# Patient Record
Sex: MALE | Race: WHITE | NOT HISPANIC OR LATINO | Employment: FULL TIME | URBAN - METROPOLITAN AREA
[De-identification: names, ages, dates, MRNs, and addresses within clinical notes are randomized per-mention and may not be internally consistent; named-entity substitution may affect disease eponyms.]

---

## 2018-04-09 ENCOUNTER — OFFICE VISIT (OUTPATIENT)
Dept: GASTROENTEROLOGY | Facility: CLINIC | Age: 43
End: 2018-04-09

## 2018-04-09 VITALS
DIASTOLIC BLOOD PRESSURE: 84 MMHG | TEMPERATURE: 98.5 F | HEIGHT: 69 IN | WEIGHT: 279.6 LBS | BODY MASS INDEX: 41.41 KG/M2 | SYSTOLIC BLOOD PRESSURE: 138 MMHG

## 2018-04-09 DIAGNOSIS — K76.0 FATTY LIVER: ICD-10-CM

## 2018-04-09 DIAGNOSIS — IMO0001 CLASS 3 OBESITY DUE TO EXCESS CALORIES WITHOUT SERIOUS COMORBIDITY WITH BODY MASS INDEX (BMI) OF 40.0 TO 44.9 IN ADULT: ICD-10-CM

## 2018-04-09 DIAGNOSIS — R10.11 RIGHT UPPER QUADRANT ABDOMINAL PAIN: Primary | ICD-10-CM

## 2018-04-09 DIAGNOSIS — K21.9 GASTROESOPHAGEAL REFLUX DISEASE, ESOPHAGITIS PRESENCE NOT SPECIFIED: ICD-10-CM

## 2018-04-09 PROCEDURE — 99204 OFFICE O/P NEW MOD 45 MIN: CPT | Performed by: INTERNAL MEDICINE

## 2018-04-09 RX ORDER — PANTOPRAZOLE SODIUM 40 MG/1
40 TABLET, DELAYED RELEASE ORAL 2 TIMES DAILY PRN
Refills: 0 | COMMUNITY
Start: 2018-03-21

## 2018-04-09 NOTE — PATIENT INSTRUCTIONS
Continue healthy eating habits.  Continue working on weight loss    Start 800IU vitamin E every day    For any additional questions, concerns or changes to your condition after today's office visit please contact the office at [phone].

## 2018-04-09 NOTE — PROGRESS NOTES
Chief Complaint   Patient presents with   • Abdominal Pain   • discuss radiology results       Subjective     HPI    Randy Higgins is a 43 y.o. male with a past medical history noted below who presents for evaluation of abdominal pain and abnormal liver imaging.  Pain started in October without obvious precipitant.  Pain located RUQ.  It was initially happening daily.  Dull, mild.  No associated nausea or vomiting.  Saw his pcp, had normal CMP (scanned in from Dr Patel's PA-- AST 17, ALT 27, Cr 1.08, Hb 16.2, plt 249).  Had a RUQ US 3/12/18 showing Borderline hepatomegaly and mild steatosis.  Since that time, he has done some research and has focused on healthier diet changes in activity in order to lose weight.  He has lost about 15 pounds.  The pain is still there but is now intermittent. He has decreased his alcohol intake.  No change in bowel habits.    No family history of GI malignancies.  Former smoker.  Drinks about 2 EtOH drinks per week (previously per day).  No abdominal surgeries.  Works as a .    GERD controlled on pantoprazole.        Past Medical History:   Diagnosis Date   • GERD (gastroesophageal reflux disease)          Current Outpatient Prescriptions:   •  pantoprazole (PROTONIX) 40 MG EC tablet, Take 40 mg by mouth 2 (Two) Times a Day As Needed. 200001, Disp: , Rfl: 0    Allergies   Allergen Reactions   • Erythromycin Rash   • Zithromax [Azithromycin] GI Intolerance       Social History     Social History   • Marital status:      Spouse name: N/A   • Number of children: N/A   • Years of education: N/A     Occupational History   • Not on file.     Social History Main Topics   • Smoking status: Former Smoker     Packs/day: 1.00     Start date: 1992     Quit date: 1997   • Smokeless tobacco: Never Used   • Alcohol use 1.2 oz/week     2 Cans of beer per week      Comment: weekly   • Drug use: No   • Sexual activity: Not on file     Other Topics Concern   • Not on file      Social History Narrative   • No narrative on file       History reviewed. No pertinent family history.    Review of Systems   Constitutional: Negative for activity change, appetite change and fatigue.   HENT: Negative for sore throat and trouble swallowing.    Respiratory: Negative.    Cardiovascular: Negative.    Gastrointestinal: Positive for abdominal pain. Negative for abdominal distention and blood in stool.   Endocrine: Negative for cold intolerance and heat intolerance.   Genitourinary: Negative for difficulty urinating, dysuria and frequency.   Musculoskeletal: Negative for arthralgias, back pain and myalgias.   Skin: Negative.    Hematological: Negative for adenopathy. Does not bruise/bleed easily.   All other systems reviewed and are negative.      Objective     Vitals:    04/09/18 1022   BP: 138/84   Temp: 98.5 °F (36.9 °C)     1    04/09/18  1022   Weight: 127 kg (279 lb 9.6 oz)     Body mass index is 41.29 kg/m².    Physical Exam   Constitutional: He is oriented to person, place, and time. He appears well-developed and well-nourished. No distress.   obese   HENT:   Head: Normocephalic and atraumatic.   Right Ear: External ear normal.   Left Ear: External ear normal.   Nose: Nose normal.   Mouth/Throat: Oropharynx is clear and moist.   Eyes: Conjunctivae and EOM are normal. Right eye exhibits no discharge. Left eye exhibits no discharge. No scleral icterus.   Neck: Normal range of motion. Neck supple. No thyromegaly present.   No supraclavicular adenopathy   Cardiovascular: Normal rate, regular rhythm, normal heart sounds and intact distal pulses.  Exam reveals no gallop.    No murmur heard.  No lower extremity edema   Pulmonary/Chest: Effort normal and breath sounds normal. No respiratory distress. He has no wheezes.   Abdominal: Soft. Normal appearance and bowel sounds are normal. He exhibits no distension and no mass. There is no hepatosplenomegaly. There is no tenderness. There is no rigidity, no  rebound and no guarding. No hernia.   Genitourinary:   Genitourinary Comments: Rectal exam deferred   Musculoskeletal: Normal range of motion. He exhibits no edema or tenderness.   No atrophy of upper or lower extremities.  Normal digits and nails of both hands.   Lymphadenopathy:     He has no cervical adenopathy.   Neurological: He is alert and oriented to person, place, and time. He displays no atrophy. Coordination normal.   Skin: Skin is warm and dry. No rash noted. He is not diaphoretic. No erythema.   Psychiatric: He has a normal mood and affect. His behavior is normal. Judgment and thought content normal.   Vitals reviewed.      No results found for: WBC, RBC, HGB, HCT, MCV, MCH, MCHC, RDW, RDWSD, MPV, PLT, NEUTRORELPCT, LYMPHORELPCT, MONORELPCT, EOSRELPCT, BASORELPCT, AUTOIGPER, NEUTROABS, LYMPHSABS, MONOSABS, EOSABS, BASOSABS, AUTOIGNUM, NRBC    No results found for: GLUCOSE, NA, K, CO2, CL, ANIONGAP, CREATININE, BUN, BCR, CALCIUM, EGFRIFNONA, ALKPHOS, PROTEINTOT, ALT, AST, BILITOT, ALBUMIN, GLOB, LABIL2      Imaging Results (last 7 days)     ** No results found for the last 168 hours. **            No notes on file    Assessment/Plan    Right upper quadrant abdominal pain: likely due to capsular stretch from hepatomegaly. Improving    Fatty liver: as per imaging, normal transaminases.  Suspect combination obesity and ETOH consumption    Gastroesophageal reflux disease: stable on PPI    Class 3 obesity: working on weight loss    Plan  Start vitamin E 800IU  Continue efforts at weight loss  Will revisit in 6 months, anticipate continued improvement in symptoms with ongoing weight loss    Randy was seen today for abdominal pain and discuss radiology results.    Diagnoses and all orders for this visit:    Right upper quadrant abdominal pain    Fatty liver    Gastroesophageal reflux disease, esophagitis presence not specified    Class 3 obesity due to excess calories without serious comorbidity with body mass  index (BMI) of 40.0 to 44.9 in adult        I have discussed the above plan with the patient.  They verbalize understanding and are in agreement with the plan.  They have been advised to contact the office for any questions, concerns, or changes related to their health.    Dictated utilizing Dragon dictation